# Patient Record
Sex: MALE | Race: WHITE | NOT HISPANIC OR LATINO | Employment: FULL TIME | URBAN - NONMETROPOLITAN AREA
[De-identification: names, ages, dates, MRNs, and addresses within clinical notes are randomized per-mention and may not be internally consistent; named-entity substitution may affect disease eponyms.]

---

## 2018-04-04 ENCOUNTER — OFFICE VISIT CONVERTED (OUTPATIENT)
Dept: FAMILY MEDICINE CLINIC | Age: 63
End: 2018-04-04
Attending: NURSE PRACTITIONER

## 2019-01-16 ENCOUNTER — HOSPITAL ENCOUNTER (OUTPATIENT)
Dept: OTHER | Facility: HOSPITAL | Age: 64
Discharge: HOME | End: 2019-01-16
Attending: NURSE PRACTITIONER

## 2019-01-16 ENCOUNTER — OFFICE VISIT CONVERTED (OUTPATIENT)
Dept: FAMILY MEDICINE CLINIC | Age: 64
End: 2019-01-16
Attending: NURSE PRACTITIONER

## 2019-01-16 LAB
ALBUMIN SERPL-MCNC: 4.5 G/DL (ref 3.5–5)
ALBUMIN/GLOB SERPL: 1.6 {RATIO} (ref 1.4–2.6)
ALP SERPL-CCNC: 58 U/L (ref 56–155)
ALT SERPL-CCNC: 34 U/L (ref 10–40)
ANION GAP SERPL CALC-SCNC: 24 MMOL/L (ref 8–19)
AST SERPL-CCNC: 17 U/L (ref 15–50)
BILIRUB SERPL-MCNC: 0.28 MG/DL (ref 0.2–1.3)
BUN SERPL-MCNC: 13 MG/DL (ref 5–25)
BUN/CREAT SERPL: 10 {RATIO} (ref 6–20)
CALCIUM SERPL-MCNC: 9.4 MG/DL (ref 8.7–10.4)
CHLORIDE SERPL-SCNC: 101 MMOL/L (ref 99–111)
CHOLEST SERPL-MCNC: 194 MG/DL (ref 107–200)
CHOLEST/HDLC SERPL: 6.1 {RATIO} (ref 3–6)
CONV CO2: 20 MMOL/L (ref 22–32)
CONV TOTAL PROTEIN: 7.3 G/DL (ref 6.3–8.2)
CREAT UR-MCNC: 1.34 MG/DL (ref 0.7–1.2)
GFR SERPLBLD BASED ON 1.73 SQ M-ARVRAT: 56 ML/MIN/{1.73_M2}
GLOBULIN UR ELPH-MCNC: 2.8 G/DL (ref 2–3.5)
GLUCOSE SERPL-MCNC: 87 MG/DL (ref 70–99)
HDLC SERPL-MCNC: 32 MG/DL (ref 40–60)
LDLC SERPL CALC-MCNC: 111 MG/DL (ref 70–100)
OSMOLALITY SERPL CALC.SUM OF ELEC: 289 MOSM/KG (ref 273–304)
POTASSIUM SERPL-SCNC: 4.6 MMOL/L (ref 3.5–5.3)
PSA SERPL-MCNC: 1.51 NG/ML (ref 0–4)
SODIUM SERPL-SCNC: 140 MMOL/L (ref 135–147)
TRIGL SERPL-MCNC: 257 MG/DL (ref 40–150)
VLDLC SERPL-MCNC: 51 MG/DL (ref 5–37)

## 2019-08-05 ENCOUNTER — OFFICE VISIT CONVERTED (OUTPATIENT)
Dept: FAMILY MEDICINE CLINIC | Age: 64
End: 2019-08-05
Attending: NURSE PRACTITIONER

## 2019-08-07 ENCOUNTER — HOSPITAL ENCOUNTER (OUTPATIENT)
Dept: OTHER | Facility: HOSPITAL | Age: 64
Discharge: HOME | End: 2019-08-07
Attending: NURSE PRACTITIONER

## 2019-08-07 ENCOUNTER — OFFICE VISIT CONVERTED (OUTPATIENT)
Dept: FAMILY MEDICINE CLINIC | Age: 64
End: 2019-08-07
Attending: NURSE PRACTITIONER

## 2019-08-07 LAB
ALBUMIN SERPL-MCNC: 4.5 G/DL (ref 3.5–5)
ALBUMIN/GLOB SERPL: 2 {RATIO} (ref 1.4–2.6)
ALP SERPL-CCNC: 50 U/L (ref 56–155)
ALT SERPL-CCNC: 34 U/L (ref 10–40)
ANION GAP SERPL CALC-SCNC: 18 MMOL/L (ref 8–19)
AST SERPL-CCNC: 21 U/L (ref 15–50)
BILIRUB SERPL-MCNC: 0.41 MG/DL (ref 0.2–1.3)
BUN SERPL-MCNC: 12 MG/DL (ref 5–25)
BUN/CREAT SERPL: 10 {RATIO} (ref 6–20)
CALCIUM SERPL-MCNC: 9.4 MG/DL (ref 8.7–10.4)
CHLORIDE SERPL-SCNC: 102 MMOL/L (ref 99–111)
CHOLEST SERPL-MCNC: 133 MG/DL (ref 107–200)
CHOLEST/HDLC SERPL: 4 {RATIO} (ref 3–6)
CONV CO2: 26 MMOL/L (ref 22–32)
CONV TOTAL PROTEIN: 6.7 G/DL (ref 6.3–8.2)
CREAT UR-MCNC: 1.2 MG/DL (ref 0.7–1.2)
GFR SERPLBLD BASED ON 1.73 SQ M-ARVRAT: >60 ML/MIN/{1.73_M2}
GLOBULIN UR ELPH-MCNC: 2.2 G/DL (ref 2–3.5)
GLUCOSE SERPL-MCNC: 85 MG/DL (ref 70–99)
HDLC SERPL-MCNC: 33 MG/DL (ref 40–60)
LDLC SERPL CALC-MCNC: 76 MG/DL (ref 70–100)
OSMOLALITY SERPL CALC.SUM OF ELEC: 291 MOSM/KG (ref 273–304)
POTASSIUM SERPL-SCNC: 4.7 MMOL/L (ref 3.5–5.3)
SODIUM SERPL-SCNC: 141 MMOL/L (ref 135–147)
TRIGL SERPL-MCNC: 120 MG/DL (ref 40–150)
VLDLC SERPL-MCNC: 24 MG/DL (ref 5–37)

## 2019-08-08 LAB — HCV AB SER DONR QL: <0.1 S/CO RATIO (ref 0–0.9)

## 2019-09-03 ENCOUNTER — OFFICE VISIT CONVERTED (OUTPATIENT)
Dept: FAMILY MEDICINE CLINIC | Age: 64
End: 2019-09-03
Attending: NURSE PRACTITIONER

## 2019-11-06 ENCOUNTER — OFFICE VISIT CONVERTED (OUTPATIENT)
Dept: FAMILY MEDICINE CLINIC | Age: 64
End: 2019-11-06
Attending: NURSE PRACTITIONER

## 2019-11-07 ENCOUNTER — HOSPITAL ENCOUNTER (OUTPATIENT)
Dept: OTHER | Facility: HOSPITAL | Age: 64
Discharge: HOME | End: 2019-11-07
Attending: NURSE PRACTITIONER

## 2020-01-01 ENCOUNTER — TELEPHONE (OUTPATIENT)
Dept: ORTHOPEDIC SURGERY | Facility: CLINIC | Age: 65
End: 2020-01-01

## 2020-01-01 ENCOUNTER — TELEPHONE (OUTPATIENT)
Dept: ORTHOPEDICS | Facility: OTHER | Age: 65
End: 2020-01-01

## 2020-01-01 ENCOUNTER — OFFICE VISIT (OUTPATIENT)
Dept: ORTHOPEDIC SURGERY | Facility: CLINIC | Age: 65
End: 2020-01-01

## 2020-01-01 VITALS — TEMPERATURE: 96.9 F | HEIGHT: 75 IN | WEIGHT: 260 LBS | BODY MASS INDEX: 32.33 KG/M2

## 2020-01-01 DIAGNOSIS — Z96.652 STATUS POST TOTAL KNEE REPLACEMENT, LEFT: ICD-10-CM

## 2020-01-01 DIAGNOSIS — M17.11 PRIMARY OSTEOARTHRITIS OF RIGHT KNEE: Primary | ICD-10-CM

## 2020-01-01 PROCEDURE — 99213 OFFICE O/P EST LOW 20 MIN: CPT | Performed by: ORTHOPAEDIC SURGERY

## 2020-01-08 ENCOUNTER — OFFICE VISIT CONVERTED (OUTPATIENT)
Dept: FAMILY MEDICINE CLINIC | Age: 65
End: 2020-01-08
Attending: NURSE PRACTITIONER

## 2020-01-30 ENCOUNTER — OFFICE VISIT (OUTPATIENT)
Dept: ORTHOPEDIC SURGERY | Facility: CLINIC | Age: 65
End: 2020-01-30

## 2020-01-30 DIAGNOSIS — Z96.652 STATUS POST TOTAL KNEE REPLACEMENT, LEFT: ICD-10-CM

## 2020-01-30 DIAGNOSIS — M17.11 PRIMARY OSTEOARTHRITIS OF RIGHT KNEE: ICD-10-CM

## 2020-01-30 DIAGNOSIS — M25.562 LEFT KNEE PAIN, UNSPECIFIED CHRONICITY: Primary | ICD-10-CM

## 2020-01-30 PROCEDURE — 73560 X-RAY EXAM OF KNEE 1 OR 2: CPT | Performed by: ORTHOPAEDIC SURGERY

## 2020-01-30 PROCEDURE — 99406 BEHAV CHNG SMOKING 3-10 MIN: CPT | Performed by: ORTHOPAEDIC SURGERY

## 2020-01-30 PROCEDURE — 99203 OFFICE O/P NEW LOW 30 MIN: CPT | Performed by: ORTHOPAEDIC SURGERY

## 2020-01-30 RX ORDER — PRAVASTATIN SODIUM 20 MG
20 TABLET ORAL
COMMUNITY
Start: 2019-12-01 | End: 2021-01-01 | Stop reason: SDUPTHER

## 2020-01-30 RX ORDER — LISINOPRIL 10 MG/1
TABLET ORAL
COMMUNITY
Start: 2020-01-27 | End: 2021-01-01 | Stop reason: SDUPTHER

## 2020-01-30 RX ORDER — DICLOFENAC SODIUM 75 MG/1
75 TABLET, DELAYED RELEASE ORAL 2 TIMES DAILY
COMMUNITY
Start: 2020-01-11 | End: 2021-01-01

## 2020-02-08 PROBLEM — Z96.652 STATUS POST TOTAL KNEE REPLACEMENT, LEFT: Status: ACTIVE | Noted: 2020-02-08

## 2020-02-08 PROBLEM — M25.562 LEFT KNEE PAIN: Status: ACTIVE | Noted: 2020-02-08

## 2020-02-08 PROBLEM — M17.11 PRIMARY OSTEOARTHRITIS OF RIGHT KNEE: Status: ACTIVE | Noted: 2020-02-08

## 2020-04-14 ENCOUNTER — OFFICE VISIT CONVERTED (OUTPATIENT)
Dept: FAMILY MEDICINE CLINIC | Age: 65
End: 2020-04-14
Attending: NURSE PRACTITIONER

## 2020-05-01 ENCOUNTER — OFFICE VISIT (OUTPATIENT)
Dept: ORTHOPEDIC SURGERY | Facility: CLINIC | Age: 65
End: 2020-05-01

## 2020-05-01 VITALS — TEMPERATURE: 97.5 F

## 2020-05-01 DIAGNOSIS — Z96.652 STATUS POST TOTAL KNEE REPLACEMENT, LEFT: ICD-10-CM

## 2020-05-01 DIAGNOSIS — M17.11 PRIMARY OSTEOARTHRITIS OF RIGHT KNEE: Primary | ICD-10-CM

## 2020-05-01 PROCEDURE — 99213 OFFICE O/P EST LOW 20 MIN: CPT | Performed by: ORTHOPAEDIC SURGERY

## 2020-05-01 NOTE — PROGRESS NOTES
FOLLOW UP VISIT    Patient: Walter Raya  ?  YOB: 1955    MRN: [id number]  ?  Chief Complaint   Patient presents with   • Left Knee - Follow-up, Pain   • Right Knee - Follow-up, Pain      ?  HPI:   Walter Ford underwent a left total knee arthroplasty about 2 years ago.  He is doing fair with his left knee replacement.  He states that he regrets having that surgery done by Dr. Uriel Pollard.  His pain never really improved after that knee replacement surgery.  He states that he wishes he never had that knee replaced because it has given him pain and instability ever since the surgery.  He does have quite a bit of pain and discomfort on his right knee as well.  He states however he is not inclined towards any surgical intervention because of the persistent issue with pain and discomfort following his left knee replacement.  He states that the braces seem to help him the most and he is reluctant to obtain any injections at this point because his symptoms are somewhat tolerable on the right knee with the use of anti-inflammatory medication.  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      Pain Location: bilateral knee  Radiation: none  Quality: dull, aching  Intensity/Severity: moderate  Duration: Several months  Onset quality: gradual   Timing: intermittent  Aggravating Factors: kneeling, rising after sitting, squatting  Alleviating Factors: NSAIDs, brace  Previous Episodes: yes  Associated Symptoms: pain, swelling, decreased strength, And instability of both knees  ADLs Affected: ambulating, work related activities, recreational activities/sports  Previous Treatment: Left total knee arthroplasty 2 years ago.    This patient is an established patient.  This problem is not new to this examiner.      Allergies: No Known Allergies    Medications:   Home Medications:  Current  Outpatient Medications on File Prior to Visit   Medication Sig   • diclofenac (VOLTAREN) 75 MG EC tablet Take 75 mg by mouth 2 (Two) Times a Day.   • lisinopril (PRINIVIL,ZESTRIL) 10 MG tablet    • pravastatin (PRAVACHOL) 20 MG tablet Take 20 mg by mouth every night at bedtime.     No current facility-administered medications on file prior to visit.      Current Medications:  Scheduled Meds:  PRN Meds:.    I have reviewed the patient's medical history in detail and updated the computerized patient record.  Review and summarization of old records include:    History reviewed. No pertinent past medical history.  Past Surgical History:   Procedure Laterality Date   • KNEE ARTHROPLASTY Right 06/12/2018     Social History     Occupational History   • Not on file   Tobacco Use   • Smoking status: Current Every Day Smoker   Substance and Sexual Activity   • Alcohol use: Never     Frequency: Never   • Drug use: Not on file   • Sexual activity: Defer      History reviewed. No pertinent family history.      Review of Systems   Constitutional: Negative.  Negative for fever.   HENT: Negative.    Eyes: Negative.    Respiratory: Negative.    Cardiovascular: Negative.    Gastrointestinal: Negative.    Endocrine: Negative.    Musculoskeletal: Positive for arthralgias, gait problem and joint swelling.   Skin: Negative.  Negative for rash and wound.   Allergic/Immunologic: Negative.    Neurological: Negative for numbness.   Hematological: Negative.    Psychiatric/Behavioral: Negative.           Wt Readings from Last 3 Encounters:   No data found for Wt     Ht Readings from Last 3 Encounters:   No data found for Ht     There is no height or weight on file to calculate BMI.  No height and weight on file for this encounter.  Vitals:    05/01/20 1007   Temp: 97.5 °F (36.4 °C)         Physical Exam  Constitutional: Patient is oriented to person, place, and time. Appears well-developed and well-nourished.   HENT:   Head: Normocephalic and  atraumatic.   Eyes: Conjunctivae and EOM are normal. Pupils are equal, round, and reactive to light.   Cardiovascular: Normal rate, regular rhythm, normal heart sounds and intact distal pulses.   Pulmonary/Chest: Effort normal and breath sounds normal.   Musculoskeletal:   See detailed exam below   Neurological: Alert and oriented to person, place, and time. No sensory deficit. Coordination normal.   Skin: Skin is warm and dry. Capillary refill takes less than 2 seconds. No rash noted. No erythema.   Psychiatric: Patient has a normal mood and affect. His behavior is normal. Judgment and thought content normal.   Nursing note and vitals reviewed.      Ortho Exam:   Right knee (varus). Patient has crepitus throughout range of motion. Positive patellar grind test. Mild effusion. Lachman is negative. Pivot shift is negative. Anterior and posterior drawer signs are negative. Significant joint line tenderness is noted on the medial aspect of the knee. Patient has a varus orientation of the knee. There is fullness and tenderness in the Popliteal fossa. Mild distention of a Popliteal cyst is noted in this location. Range of motion in flexion is from 0-120 degrees. Neurovascular status is intact.  Dorsalis pedis and posterior tibial artery pulses are palpable. Common peroneal nerve function is well preserved. Patient's gait is cautious and antalgic. Skin and soft tissues are mildly swollen, consistent with synovitis and effusion. The patient has a significant limp with the first few steps after starting the gait cycle. Getting out of a chair takes a lot of effort due to pain on knee flexion.     Left knee.The patient is status post total knee arthroplasty postoperative 2 year(s). Incision is clean. Calf is soft and nontender. Homans sign is negative. There is no clicking, popping or catching. Anterior and posterior drawer signs are negative.  There is no instability of the components. Appropriate amounts of swelling and  bruising are noted. Dorsalis pedis and posterior tibial artery pulses are palpable. Common peroneal nerve function is well preserved. Range of motion is from 0-120 degrees of flexion. Gait is cautious but otherwise fairly normal. There is no evidence of a deep seated joint infection.        Diagnostics:  no diagnostic testing performed this visit      Assessment:  Walter was seen today for follow-up, pain, follow-up and pain.    Diagnoses and all orders for this visit:    Primary osteoarthritis of right knee    Status post total knee replacement, left          Procedures  ?    Plan    · Compression/brace to both the knees to prevent buckling and giving out.  New braces have been provided to the patient at this office visit because his old ones straps have been worn out.  · Intra-articular steroid injection for the right knee discussed and offered to the patient.  He states that he will let me know when he is ready for that form of surgical intervention.  · Rest, ice, compression, and elevation (RICE) therapy  · Stretching and strengthening exercises of the quads and the hamstrings.  · Tylenol 500-1000mg by mouth every 6 hours as needed for pain   · Follow up in 3 month(s).  · , GI and dental procedure prophylaxis with antibiotics to prevent metastatic infection to the knee arthroplasty implants.  · Patient states that he will let me know when he is ready for a right knee replacement and is reluctant to do so because of his poor outcome on the left side postoperatively.    Date of encounter: 05/01/2020   Jeffrey Forte MD

## 2020-06-01 DIAGNOSIS — M17.11 PRIMARY OSTEOARTHRITIS OF RIGHT KNEE: Primary | ICD-10-CM

## 2020-06-01 NOTE — TELEPHONE ENCOUNTER
PT REQUESTING AN ANTI INFLAMMATORY MEDICATION. HE STATES  HE HAS HAD A LOT  OF PAIN IN HIS KNEES OVER THE WEEKEND.     PLEASE ADVISE    [facility]

## 2020-06-02 RX ORDER — MELOXICAM 7.5 MG/1
TABLET ORAL
Qty: 40 TABLET | Refills: 1 | Status: SHIPPED | OUTPATIENT
Start: 2020-06-02 | End: 2021-01-01

## 2020-06-02 NOTE — TELEPHONE ENCOUNTER
Please call the patient in a prescription of meloxicam 7.5 mg tab 1 p.o. nightly total 40 pills with 1 refill.  Thank you

## 2020-07-16 ENCOUNTER — HOSPITAL ENCOUNTER (OUTPATIENT)
Dept: OTHER | Facility: HOSPITAL | Age: 65
Discharge: HOME | End: 2020-07-16
Attending: NURSE PRACTITIONER

## 2020-07-16 ENCOUNTER — OFFICE VISIT CONVERTED (OUTPATIENT)
Dept: FAMILY MEDICINE CLINIC | Age: 65
End: 2020-07-16
Attending: NURSE PRACTITIONER

## 2020-07-16 LAB
ALBUMIN SERPL-MCNC: 4.7 G/DL (ref 3.5–5)
ALBUMIN/GLOB SERPL: 2.2 {RATIO} (ref 1.4–2.6)
ALP SERPL-CCNC: 64 U/L (ref 56–155)
ALT SERPL-CCNC: 21 U/L (ref 10–40)
ANION GAP SERPL CALC-SCNC: 18 MMOL/L (ref 8–19)
AST SERPL-CCNC: 15 U/L (ref 15–50)
BASOPHILS # BLD MANUAL: 0.04 10*3/UL (ref 0–0.2)
BASOPHILS NFR BLD MANUAL: 0.8 % (ref 0–3)
BILIRUB SERPL-MCNC: 0.35 MG/DL (ref 0.2–1.3)
BUN SERPL-MCNC: 14 MG/DL (ref 5–25)
BUN/CREAT SERPL: 11 {RATIO} (ref 6–20)
CALCIUM SERPL-MCNC: 9.6 MG/DL (ref 8.7–10.4)
CHLORIDE SERPL-SCNC: 103 MMOL/L (ref 99–111)
CHOLEST SERPL-MCNC: 129 MG/DL (ref 107–200)
CHOLEST/HDLC SERPL: 3.3 {RATIO} (ref 3–6)
CONV CO2: 23 MMOL/L (ref 22–32)
CONV TOTAL PROTEIN: 6.8 G/DL (ref 6.3–8.2)
CREAT UR-MCNC: 1.26 MG/DL (ref 0.7–1.2)
DEPRECATED RDW RBC AUTO: 44.6 FL
EOSINOPHIL # BLD MANUAL: 0 10*3/UL (ref 0–0.7)
EOSINOPHIL NFR BLD MANUAL: 0 % (ref 0–7)
ERYTHROCYTE [DISTWIDTH] IN BLOOD BY AUTOMATED COUNT: 13.4 % (ref 11.5–14.5)
GFR SERPLBLD BASED ON 1.73 SQ M-ARVRAT: 60 ML/MIN/{1.73_M2}
GLOBULIN UR ELPH-MCNC: 2.1 G/DL (ref 2–3.5)
GLUCOSE SERPL-MCNC: 87 MG/DL (ref 70–99)
GRANS (ABSOLUTE): 3.23 10*3/UL (ref 2–8)
GRANS: 67.7 % (ref 30–85)
HBA1C MFR BLD: 14.2 G/DL (ref 14–18)
HCT VFR BLD AUTO: 43.4 % (ref 42–52)
HDLC SERPL-MCNC: 39 MG/DL (ref 40–60)
IMM GRANULOCYTES # BLD: 0.01 10*3/UL (ref 0–0.54)
IMM GRANULOCYTES NFR BLD: 0.2 % (ref 0–0.43)
LDLC SERPL CALC-MCNC: 72 MG/DL (ref 70–100)
LYMPHOCYTES # BLD MANUAL: 1.24 10*3/UL (ref 1–5)
LYMPHOCYTES NFR BLD MANUAL: 5.4 % (ref 3–10)
MCH RBC QN AUTO: 29.3 PG (ref 27–31)
MCHC RBC AUTO-ENTMCNC: 32.7 G/DL (ref 33–37)
MCV RBC AUTO: 89.7 FL (ref 80–96)
MONOCYTES # BLD AUTO: 0.26 10*3/UL (ref 0.2–1.2)
OSMOLALITY SERPL CALC.SUM OF ELEC: 288 MOSM/KG (ref 273–304)
PLATELET # BLD AUTO: 187 10*3/UL (ref 130–400)
PMV BLD AUTO: 9.9 FL (ref 7.4–10.4)
POTASSIUM SERPL-SCNC: 4.7 MMOL/L (ref 3.5–5.3)
PSA SERPL-MCNC: 2.1 NG/ML (ref 0–4)
RBC # BLD AUTO: 4.84 10*6/UL (ref 4.7–6.1)
SODIUM SERPL-SCNC: 139 MMOL/L (ref 135–147)
TRIGL SERPL-MCNC: 89 MG/DL (ref 40–150)
TSH SERPL-ACNC: 3.28 M[IU]/L (ref 0.27–4.2)
VARIANT LYMPHS NFR BLD MANUAL: 25.9 % (ref 20–45)
VLDLC SERPL-MCNC: 18 MG/DL (ref 5–37)
WBC # BLD AUTO: 4.78 10*3/UL (ref 4.8–10.8)

## 2020-07-21 DIAGNOSIS — Z96.652 STATUS POST TOTAL KNEE REPLACEMENT, LEFT: Primary | ICD-10-CM

## 2020-07-21 RX ORDER — TRAMADOL HYDROCHLORIDE 50 MG/1
TABLET ORAL
Qty: 40 TABLET | Refills: 0 | Status: SHIPPED | OUTPATIENT
Start: 2020-07-21 | End: 2020-08-20

## 2020-07-21 NOTE — TELEPHONE ENCOUNTER
Tried to call the patient to let him know rx has been sent in for him and his phone message stated he was not taking calls at this time.  Called 3 X and was unable to reach patient.

## 2020-07-21 NOTE — TELEPHONE ENCOUNTER
Please call the patient in a prescription of Ultram 50 mg tab 1 p.o. q. at bedtime PRN pain total 40 pills no refills.  If you send it to me electronically I will sign off on it.  Thank you

## 2020-07-21 NOTE — TELEPHONE ENCOUNTER
PATIENT IS REQUESTING  PAIN MEDICATION DUE TO INCREASED KNEE PAIN. THE PATIENT STATES HE HAD TO  LEAVE WORK DUE TO THE PAIN,    [facility]

## 2020-07-29 DIAGNOSIS — M25.562 CHRONIC PAIN OF LEFT KNEE: ICD-10-CM

## 2020-07-29 DIAGNOSIS — Z96.652 STATUS POST TOTAL KNEE REPLACEMENT, LEFT: Primary | ICD-10-CM

## 2020-07-29 DIAGNOSIS — G89.29 CHRONIC PAIN OF LEFT KNEE: ICD-10-CM

## 2020-07-29 DIAGNOSIS — M17.11 PRIMARY OSTEOARTHRITIS OF RIGHT KNEE: ICD-10-CM

## 2020-07-30 ENCOUNTER — OFFICE VISIT (OUTPATIENT)
Dept: ORTHOPEDIC SURGERY | Facility: CLINIC | Age: 65
End: 2020-07-30

## 2020-07-30 ENCOUNTER — HOSPITAL ENCOUNTER (OUTPATIENT)
Dept: OTHER | Facility: HOSPITAL | Age: 65
Discharge: HOME | End: 2020-07-30
Attending: ORTHOPAEDIC SURGERY

## 2020-07-30 VITALS — TEMPERATURE: 96.7 F | HEIGHT: 74 IN | WEIGHT: 260 LBS | BODY MASS INDEX: 33.37 KG/M2

## 2020-07-30 DIAGNOSIS — M17.11 PRIMARY OSTEOARTHRITIS OF RIGHT KNEE: ICD-10-CM

## 2020-07-30 DIAGNOSIS — Z96.652 STATUS POST TOTAL KNEE REPLACEMENT, LEFT: Primary | ICD-10-CM

## 2020-07-30 PROCEDURE — 99213 OFFICE O/P EST LOW 20 MIN: CPT | Performed by: ORTHOPAEDIC SURGERY

## 2020-08-06 ENCOUNTER — TELEPHONE (OUTPATIENT)
Dept: ORTHOPEDIC SURGERY | Facility: CLINIC | Age: 65
End: 2020-08-06

## 2020-08-13 NOTE — TELEPHONE ENCOUNTER
I called the patient and discussed with him his reports on the x-ray.  We have offered him a brace for his right knee.  We have also discussed about possibly getting him steroid injection for the right knee.  He will contact his brother who is his caregiver and decide about coming back to the office for knee injection.  Thank you

## 2020-08-13 NOTE — TELEPHONE ENCOUNTER
Would you pretty please pull up this patient's x-rays for me to look at them and then I will be glad to call him about the reports of his x-ray findings.  Thank you

## 2020-08-17 ENCOUNTER — TELEPHONE (OUTPATIENT)
Dept: ORTHOPEDIC SURGERY | Facility: CLINIC | Age: 65
End: 2020-08-17

## 2020-08-17 NOTE — TELEPHONE ENCOUNTER
PATIENT OBIE CALLED AND WANTED TO KNOW IF THE BRACE THAT WAS MENTIONED TO HIM WAS IN. HE WAS UNDER THE IMPRESSION SOMEONE WOULD CALL HIM WHEN IT WAS READY FOR HIM TO . ALSO HE NEEDS A NOTE TO BE OFF WORK.

## 2020-08-18 ENCOUNTER — TELEPHONE (OUTPATIENT)
Dept: ORTHOPEDIC SURGERY | Facility: CLINIC | Age: 65
End: 2020-08-18

## 2020-08-18 NOTE — TELEPHONE ENCOUNTER
I CALLED UZAIR TO LET HIM KNOW THAT CRISTI CARTY FROM [facility] WILL BE CALLING HIM REGARDING CUSTOM BRACE FOR PATIENT.  I HAVE SENT PATIENT'S INFORMATION AND UZAIR'S NUMBER TO CRISTI CARTY

## 2020-08-18 NOTE — TELEPHONE ENCOUNTER
LEFT MESSAGE WITH UZAIR, THE PATIENT'S POA, TO TRY AND SEE WHAT KIND OF A BRACE THE PATIENT IS NEEDING, DON'T KNOW IF HE NEEDS HINGED KNEE, COMPRESSION STOCKINGS??  I ASKED CRISTI CARTY AND SHE THINKS MAYBE A SCRIPT WAS GIVEN TO HIM AT HIS LAST VISIT FOR COMPRESSION STOCKINGS BUT I DON'T SEE ANYTHING SCANNED IN THE CHART.  I ASKED FOR UZAIR TO CALL ME BACK SO WE CAN TRY AND FIGURE OUT WHAT IT IS HE NEEDS AND GET HIM TAKEN CARE OF.  I AM SENDING THIS TO ALL MA'S TO SEE IF THIS RINGS A BELL TO ANYONE/RJ

## 2020-08-18 NOTE — TELEPHONE ENCOUNTER
PATIENT CAME IN OFFICE TODAY AND STATED THAT HE DOES NOT WANT A BRACE, BUT NEEDS A NOTE TO BE OFF WORK.  HE IS WANTING TO TRY TO RETURN TO WORK ON 8/28/20.  HE STATES THAT HE BELIEVES HE NEEDS TO PROCEED WITH SURGERY WITH HIS LEFT KNEE.  HE STATED THAT THE PAIN IS KEEPING HIM UP AT NIGHT.  I HAVE GIVEN PATIENT A NOTE TO REMAIN OFF WORK AT THIS TIME WITH A RETURN DATE OF 8/28/20 PER CARIN.

## 2020-08-18 NOTE — TELEPHONE ENCOUNTER
LOOKS LIKE DR. CHRISTIANSON SPOKE WITH HIM ON 8/6/20 THIS IS WHAT WAS DISCUSSED.....    Jeffrey Christianson MD     4:35 PM   Note       I called the patient and discussed with him his reports on the x-ray.  We have offered him a brace for his right knee.  We have also discussed about possibly getting him steroid injection for the right knee.  He will contact his brother who is his caregiver and decide about coming back to the office for knee injection.  Thank you

## 2020-08-18 NOTE — TELEPHONE ENCOUNTER
PATIENT'S CAREGIVER, UZAIR GERBER, CALLED AND STATED THAT THEY DEFINITELY WANT TO TRY A CUSTOM KNEE BRACE FOR PATIENT PRIOR TO SCHEDULING ANY SURGERY.  I HAVE SPOKEN WITH JACKY AND CRISTI CARTY FROM hospitals AND THEY NEED AN ORDER FOR A CUSTOM KNEE BRACE AND DR. CHRISTIANSON WOULD NEED TO DICTATE THAT THE PREVIOUS BRACES THAT THE PATIENT HAS RECEIVED DID NOT WORK FOR HIM.  PLEASE ADVISE IF OK TO PROCEED WITH ORDER FOR CUSTOM KNEE BRACE

## 2020-08-18 NOTE — TELEPHONE ENCOUNTER
This patient has slow mental abilities.  I think we showed him the hinged knee brace.  His brother had some opinion about it as well.  If you want to give him a call and have him see Barbara today I will be fine with him either having a hinged knee brace or even a T scope type of brace thanks

## 2020-08-18 NOTE — TELEPHONE ENCOUNTER
If I recall, we have already outfitted him with 2 hinged knees braces.  One was when we were at the office beside [facility].

## 2020-08-20 DIAGNOSIS — Z96.652 STATUS POST TOTAL KNEE REPLACEMENT, LEFT: ICD-10-CM

## 2020-08-20 RX ORDER — TRAMADOL HYDROCHLORIDE 50 MG/1
TABLET ORAL
Qty: 40 TABLET | Refills: 0 | Status: SHIPPED | OUTPATIENT
Start: 2020-08-20 | End: 2021-01-01

## 2020-08-24 ENCOUNTER — TELEPHONE (OUTPATIENT)
Dept: ORTHOPEDIC SURGERY | Facility: CLINIC | Age: 65
End: 2020-08-24

## 2020-08-24 NOTE — TELEPHONE ENCOUNTER
PATIENT CALLED AND STATED THAT HE FELL THIS MORNING AND HE CAN HARDLY WALK.  HE IS ASKING FOR A NOTE TO REMAIN OFF WORK.  HE IS CURRENTLY SCHEDULED TO RETURN TO WORK ON 8/28/20.  PATIENT HAS NOT YET BEEN FITTED FOR CUSTOM BRACE.  IS IT OK TO GIVE NOTE FOR PATIENT TO BE OFF WORK?

## 2020-08-24 NOTE — TELEPHONE ENCOUNTER
CALLED UZAIR TO LET HIM KNOW THAT DR. CHRISTIANSON IS OK TO GIVE PATIENT NOTE TO BE OFF WORK.  UZAIR STATED THAT PATIENT IS SCHEDULED TO COME IN THE OFFICE ON Thursday TO BE FITTED FOR CUSTOM KNEE BRACE.  HE ASKED THAT NOTE REFLECT THAT PATIENT BE OFF WORK UNTIL HE RECEIVES BRACE.  HE DOESN'T FEEL PATIENT CAN RETURN TO WORK WITHOUT BRACE.  HE WILL LET PATIENT KNOW THAT HIS WORK NOTE WILL BE AVAILABLE TO  WHEN HE COMES IN ON THURSDAY

## 2020-09-18 NOTE — TELEPHONE ENCOUNTER
I CALLED AND LEFT JACKY WITH TANIA A MESSAGE LETTING HER KNOW THAT DR CHRISTIANSON HAS UPDATED THE NOTE SO ITS READY TO GO.   UZAIR CALLED AND WANTED TO KNOW WHO HE NEEDED TO CONTACT ABOUT THE BRACE BECAUSE MR LOZANO WAS HAVING A HARD TIME GETTING AROUND HIS # [phone]

## 2020-10-08 NOTE — TELEPHONE ENCOUNTER
PT.STATES THAT HE SAW  ON 10/2/20 AND IS CALLING TO SEE IF KNEE BRACE IS IN.  I CALLED OFFICE AND SPOKE TO ERIN KIM. SHE SAID THAT THE BRACE HAS BEEN ORDERED THROUGH [facility].   STATES THAT PT. NEEDS TO CALL [facility] TO SEE IF BRACE HAS COME IN.   I GAVE PT PHONE NUMBER TO [facility] MAIN OFFICE IN [place] TO CALL TO SEE IF BRACE IS IN.

## 2020-11-05 NOTE — TELEPHONE ENCOUNTER
TRIED CALLING PATIENT, BUT HIS DAUGHTER, DESIREE, ANSWERED THE PHONE.  SHE IS NOT LISTED ON THE  VERBAL FOR US TO SPEAK WITH SO I CALLED UZAIR GERBER AND LEFT VOICEMAIL WITH CRISTI CARTY'S PHONE NUMBER FOR THEM TO CONTACT HER [phone].    I SPOKE WITH JACKY (PINEDA'S REP) AND SHE TALKED TO CRISTI CARTY AND CRISTI CARTY SAID THAT PATIENT'S CAREGIVER, UZAIR GERBER, TEXTED HER AND STATED THAT PATIENT DOES NOT WANT THE BRACE AT THIS TIME UNTIL HE CAN GET PATIENT'S INSURANCE CHANGED.

## 2020-11-05 NOTE — TELEPHONE ENCOUNTER
PATIENT IS FOLLOWING UP ON KNEE BRACE THAT WAS SUPPOSED TO BE ORDERED.  HE WOULD LIKE FOR SOMEONE TO CALL BACK TO DISCUSS.

## 2020-11-16 NOTE — TELEPHONE ENCOUNTER
FYI  Patient called to say he had fallen on his left knee and is painful.    Harvinder Pappas advised patient to go to ER  To be evaluated.

## 2020-11-17 NOTE — TELEPHONE ENCOUNTER
I agree with him needing to be evaluated in the emergency room with x-rays of the knee on which she has fallen.

## 2020-11-24 NOTE — TELEPHONE ENCOUNTER
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  PT WILL  PAPERWORK ONCE READY, HE WOULD LIKE TO  BY Wednesday 11- (TOMORROW). HE CURRENTLY HAS A HANDICAP .    LUIS LOZANO MAY BE REACHED AT:  [phone]

## 2021-01-01 ENCOUNTER — LAB (OUTPATIENT)
Dept: LAB | Facility: HOSPITAL | Age: 66
End: 2021-01-01

## 2021-01-01 ENCOUNTER — OFFICE VISIT CONVERTED (OUTPATIENT)
Dept: FAMILY MEDICINE CLINIC | Age: 66
End: 2021-01-01
Attending: NURSE PRACTITIONER

## 2021-01-01 ENCOUNTER — TELEPHONE (OUTPATIENT)
Dept: FAMILY MEDICINE CLINIC | Age: 66
End: 2021-01-01

## 2021-01-01 ENCOUNTER — OFFICE VISIT (OUTPATIENT)
Dept: ORTHOPEDIC SURGERY | Facility: CLINIC | Age: 66
End: 2021-01-01

## 2021-01-01 ENCOUNTER — OFFICE VISIT (OUTPATIENT)
Dept: FAMILY MEDICINE CLINIC | Age: 66
End: 2021-01-01

## 2021-01-01 VITALS
TEMPERATURE: 97.7 F | DIASTOLIC BLOOD PRESSURE: 67 MMHG | HEART RATE: 65 BPM | WEIGHT: 275 LBS | SYSTOLIC BLOOD PRESSURE: 138 MMHG | HEIGHT: 76 IN | BODY MASS INDEX: 33.49 KG/M2

## 2021-01-01 VITALS
DIASTOLIC BLOOD PRESSURE: 54 MMHG | TEMPERATURE: 98.1 F | BODY MASS INDEX: 33.44 KG/M2 | HEART RATE: 54 BPM | WEIGHT: 274.6 LBS | SYSTOLIC BLOOD PRESSURE: 115 MMHG | OXYGEN SATURATION: 99 % | HEIGHT: 76 IN

## 2021-01-01 VITALS
HEART RATE: 66 BPM | OXYGEN SATURATION: 97 % | WEIGHT: 267.6 LBS | TEMPERATURE: 98.3 F | HEIGHT: 75 IN | DIASTOLIC BLOOD PRESSURE: 91 MMHG | BODY MASS INDEX: 33.27 KG/M2 | SYSTOLIC BLOOD PRESSURE: 132 MMHG

## 2021-01-01 VITALS
BODY MASS INDEX: 33.1 KG/M2 | HEART RATE: 61 BPM | WEIGHT: 271.8 LBS | HEIGHT: 76 IN | SYSTOLIC BLOOD PRESSURE: 125 MMHG | TEMPERATURE: 98.2 F | DIASTOLIC BLOOD PRESSURE: 73 MMHG

## 2021-01-01 VITALS
HEIGHT: 76 IN | TEMPERATURE: 98.1 F | DIASTOLIC BLOOD PRESSURE: 62 MMHG | SYSTOLIC BLOOD PRESSURE: 128 MMHG | HEART RATE: 50 BPM | BODY MASS INDEX: 33.54 KG/M2 | WEIGHT: 275.4 LBS

## 2021-01-01 VITALS
HEART RATE: 48 BPM | DIASTOLIC BLOOD PRESSURE: 96 MMHG | SYSTOLIC BLOOD PRESSURE: 127 MMHG | TEMPERATURE: 97.5 F | BODY MASS INDEX: 33.85 KG/M2 | WEIGHT: 278 LBS | HEIGHT: 76 IN

## 2021-01-01 VITALS
HEART RATE: 73 BPM | WEIGHT: 280.2 LBS | DIASTOLIC BLOOD PRESSURE: 68 MMHG | HEIGHT: 76 IN | SYSTOLIC BLOOD PRESSURE: 119 MMHG | TEMPERATURE: 97.6 F | BODY MASS INDEX: 34.12 KG/M2

## 2021-01-01 VITALS — BODY MASS INDEX: 31.66 KG/M2 | HEIGHT: 76 IN | WEIGHT: 260 LBS | TEMPERATURE: 97.4 F

## 2021-01-01 VITALS
TEMPERATURE: 97.7 F | BODY MASS INDEX: 32.88 KG/M2 | DIASTOLIC BLOOD PRESSURE: 76 MMHG | WEIGHT: 270 LBS | HEIGHT: 76 IN | SYSTOLIC BLOOD PRESSURE: 120 MMHG | HEART RATE: 56 BPM

## 2021-01-01 VITALS
HEIGHT: 76 IN | HEART RATE: 60 BPM | TEMPERATURE: 96.9 F | WEIGHT: 255.2 LBS | BODY MASS INDEX: 31.08 KG/M2 | SYSTOLIC BLOOD PRESSURE: 130 MMHG | DIASTOLIC BLOOD PRESSURE: 75 MMHG

## 2021-01-01 VITALS
HEART RATE: 54 BPM | HEIGHT: 76 IN | WEIGHT: 268.4 LBS | DIASTOLIC BLOOD PRESSURE: 76 MMHG | TEMPERATURE: 97.7 F | SYSTOLIC BLOOD PRESSURE: 132 MMHG | BODY MASS INDEX: 32.68 KG/M2

## 2021-01-01 VITALS
HEART RATE: 109 BPM | SYSTOLIC BLOOD PRESSURE: 125 MMHG | BODY MASS INDEX: 32.51 KG/M2 | TEMPERATURE: 97.5 F | HEIGHT: 76 IN | DIASTOLIC BLOOD PRESSURE: 56 MMHG | WEIGHT: 267 LBS

## 2021-01-01 DIAGNOSIS — Z12.12 SCREENING FOR COLORECTAL CANCER: ICD-10-CM

## 2021-01-01 DIAGNOSIS — E78.2 MIXED HYPERLIPIDEMIA: ICD-10-CM

## 2021-01-01 DIAGNOSIS — M17.11 PRIMARY OSTEOARTHRITIS OF RIGHT KNEE: ICD-10-CM

## 2021-01-01 DIAGNOSIS — Z12.11 SCREENING FOR COLORECTAL CANCER: ICD-10-CM

## 2021-01-01 DIAGNOSIS — Z12.5 SCREENING FOR MALIGNANT NEOPLASM OF PROSTATE: ICD-10-CM

## 2021-01-01 DIAGNOSIS — Z96.652 STATUS POST TOTAL KNEE REPLACEMENT, LEFT: ICD-10-CM

## 2021-01-01 DIAGNOSIS — N17.9 ACUTE KIDNEY INJURY (HCC): Primary | ICD-10-CM

## 2021-01-01 DIAGNOSIS — I10 ESSENTIAL (PRIMARY) HYPERTENSION: ICD-10-CM

## 2021-01-01 DIAGNOSIS — Z00.00 MEDICARE ANNUAL WELLNESS VISIT, SUBSEQUENT: Primary | ICD-10-CM

## 2021-01-01 DIAGNOSIS — Z96.652 STATUS POST TOTAL KNEE REPLACEMENT, LEFT: Primary | ICD-10-CM

## 2021-01-01 LAB
ALBUMIN SERPL-MCNC: 4.7 G/DL (ref 3.5–5.2)
ALBUMIN/GLOB SERPL: 1.7 G/DL
ALP SERPL-CCNC: 65 U/L (ref 39–117)
ALT SERPL W P-5'-P-CCNC: 17 U/L (ref 1–41)
ANION GAP SERPL CALCULATED.3IONS-SCNC: 7.3 MMOL/L (ref 5–15)
AST SERPL-CCNC: 13 U/L (ref 1–40)
BILIRUB SERPL-MCNC: 0.3 MG/DL (ref 0–1.2)
BUN SERPL-MCNC: 13 MG/DL (ref 8–23)
BUN/CREAT SERPL: 10.7 (ref 7–25)
CALCIUM SPEC-SCNC: 9.6 MG/DL (ref 8.6–10.5)
CHLORIDE SERPL-SCNC: 102 MMOL/L (ref 98–107)
CHOLEST SERPL-MCNC: 133 MG/DL (ref 0–200)
CO2 SERPL-SCNC: 27.7 MMOL/L (ref 22–29)
CREAT SERPL-MCNC: 1.22 MG/DL (ref 0.76–1.27)
GFR SERPL CREATININE-BSD FRML MDRD: 60 ML/MIN/1.73
GLOBULIN UR ELPH-MCNC: 2.7 GM/DL
GLUCOSE SERPL-MCNC: 89 MG/DL (ref 65–99)
HDLC SERPL-MCNC: 38 MG/DL (ref 40–60)
LDLC SERPL CALC-MCNC: 80 MG/DL (ref 0–100)
LDLC/HDLC SERPL: 2.11 {RATIO}
POTASSIUM SERPL-SCNC: 4.3 MMOL/L (ref 3.5–5.2)
PROT SERPL-MCNC: 7.4 G/DL (ref 6–8.5)
PSA SERPL-MCNC: 1.92 NG/ML (ref 0–4)
SODIUM SERPL-SCNC: 137 MMOL/L (ref 136–145)
TRIGL SERPL-MCNC: 75 MG/DL (ref 0–150)
VLDLC SERPL-MCNC: 15 MG/DL (ref 5–40)

## 2021-01-01 PROCEDURE — G0439 PPPS, SUBSEQ VISIT: HCPCS | Performed by: NURSE PRACTITIONER

## 2021-01-01 PROCEDURE — 1170F FXNL STATUS ASSESSED: CPT | Performed by: NURSE PRACTITIONER

## 2021-01-01 PROCEDURE — 80061 LIPID PANEL: CPT

## 2021-01-01 PROCEDURE — 36415 COLL VENOUS BLD VENIPUNCTURE: CPT

## 2021-01-01 PROCEDURE — G0103 PSA SCREENING: HCPCS

## 2021-01-01 PROCEDURE — 80053 COMPREHEN METABOLIC PANEL: CPT

## 2021-01-01 PROCEDURE — 1159F MED LIST DOCD IN RCRD: CPT | Performed by: NURSE PRACTITIONER

## 2021-01-01 PROCEDURE — 99213 OFFICE O/P EST LOW 20 MIN: CPT | Performed by: ORTHOPAEDIC SURGERY

## 2021-01-01 PROCEDURE — 99214 OFFICE O/P EST MOD 30 MIN: CPT | Performed by: NURSE PRACTITIONER

## 2021-01-01 RX ORDER — ALBUTEROL SULFATE 90 UG/1
2 AEROSOL, METERED RESPIRATORY (INHALATION) EVERY 4 HOURS PRN
COMMUNITY
End: 2021-01-01

## 2021-01-01 RX ORDER — LORATADINE 10 MG/1
10 TABLET ORAL DAILY
COMMUNITY
End: 2021-01-01

## 2021-01-01 RX ORDER — LISINOPRIL 10 MG/1
10 TABLET ORAL DAILY
Qty: 90 TABLET | Refills: 1 | Status: SHIPPED | OUTPATIENT
Start: 2021-01-01

## 2021-01-01 RX ORDER — FLUTICASONE PROPIONATE 50 MCG
2 SPRAY, SUSPENSION (ML) NASAL DAILY
COMMUNITY
End: 2021-01-01

## 2021-01-01 RX ORDER — TRAMADOL HYDROCHLORIDE 50 MG/1
TABLET ORAL
Qty: 10 TABLET | Refills: 0 | Status: SHIPPED | OUTPATIENT
Start: 2021-01-01 | End: 2021-01-01 | Stop reason: ALTCHOICE

## 2021-01-01 RX ORDER — TRAMADOL HYDROCHLORIDE 50 MG/1
TABLET ORAL
Qty: 10 TABLET | Refills: 0 | Status: SHIPPED | OUTPATIENT
Start: 2021-01-01

## 2021-01-01 RX ORDER — PRAVASTATIN SODIUM 20 MG
20 TABLET ORAL
Qty: 90 TABLET | Refills: 1 | Status: SHIPPED | OUTPATIENT
Start: 2021-01-01
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
## 2021-07-14 PROBLEM — I10 ESSENTIAL (PRIMARY) HYPERTENSION: Status: ACTIVE | Noted: 2021-01-01

## 2021-07-14 PROBLEM — R00.1 BRADYCARDIA, UNSPECIFIED: Status: ACTIVE | Noted: 2021-01-01

## 2021-07-14 PROBLEM — M19.049 PRIMARY OSTEOARTHRITIS, UNSPECIFIED HAND: Status: ACTIVE | Noted: 2021-01-01

## 2021-07-14 PROBLEM — E78.5 HYPERLIPIDEMIA, UNSPECIFIED: Status: ACTIVE | Noted: 2021-01-01

## 2021-08-13 NOTE — PROGRESS NOTES
"Chief Complaint  Surgical Clearance (RT knee, med refills)    Subjective          Walter Raya presents to [facility]    Walter is going to have right knee replacement soon with Dr Mcfadden. Did labs presurgically and creatinine was 1.46. Denies any urological symptoms. Feels that he drinks plenty of water. Had labs ordered at last visit but has not yet gotten done. Scheduled for surgery on the 24th. He is taking an OTC medication for pain but unsure of name. He is taking pretty regularly for pain.           Objective   Vital Signs:   /91   Pulse 66   Temp 98.3 °F (36.8 °C)   Ht 190.5 cm (75\")   Wt 121 kg (267 lb 9.6 oz)   SpO2 97%   BMI 33.45 kg/m²     Physical Exam  Vitals reviewed.   Constitutional:       General: He is not in acute distress.     Appearance: Normal appearance. He is well-developed.   HENT:      Head: Normocephalic and atraumatic.   Cardiovascular:      Rate and Rhythm: Normal rate and regular rhythm.   Pulmonary:      Effort: Pulmonary effort is normal.      Breath sounds: Normal breath sounds.   Neurological:      Mental Status: He is alert and oriented to person, place, and time.   Psychiatric:         Mood and Affect: Mood and affect normal.          Result Review :   The following data was reviewed by: AMANDA Gates on 08/13/2021:  TSH (07/16/2020 11:57)  PSA Screen (07/16/2020 11:57)  Lipid Panel (07/16/2020 11:57)  CBC & Differential (07/16/2020 11:57)  Lipid Panel (08/07/2019 10:55)  Hepatitis C Antibody (08/07/2019 10:55)  PSA Screen (01/16/2019 13:03)  Lipid Panel (01/16/2019 13:03)  SCANNED - LABS (08/03/2021)  PSA Screen (08/17/2021 08:51)  Comprehensive Metabolic Panel (08/17/2021 08:51)  Lipid Panel (08/17/2021 08:51)           Assessment and Plan    Diagnoses and all orders for this visit:    1. Acute kidney injury (CMS/HCC) (Primary)  Comments:  Advised decreased intake of OTC medication and increased water intake. Return next " week for labs.    2. Primary osteoarthritis of right knee  Comments:  Planned surgical intervention of right knee with Dr Mcfadden    Repeat labs with any function in normal range.  Nothing on lab work indicating reason for postponing surgical intervention.      Follow Up    Return in about 5 months (around 1/13/2022).  Patient was given instructions and counseling regarding his condition or for health maintenance advice. Please see specific information pulled into the AVS if appropriate.

## 2021-08-19 NOTE — TELEPHONE ENCOUNTER
Hub staff attempted to follow warm transfer process and was unsuccessful     Caller: [facility]    Best call back number: [phone]  FAX: [phone]    Patient is needing: NURSE WITH [facility] CALLED TO SPEAK WITH BESS LOWRY'S NURSE. SHE STATED THE PATIENT IS HAVING SURGERY ON 8/24 AND CLEARANCE IS NEEDED IN ORDER TO HAVE THAT DONE. THEY ARE REQUESTING TO SPEAK WITH BESS LOWRY OR TO HAVE HER FAX OVER CLEARANCE WITH ANY RELEVANT DOCUMENTATION FROM RECENT VISITS